# Patient Record
Sex: MALE | Race: OTHER | HISPANIC OR LATINO | URBAN - METROPOLITAN AREA
[De-identification: names, ages, dates, MRNs, and addresses within clinical notes are randomized per-mention and may not be internally consistent; named-entity substitution may affect disease eponyms.]

---

## 2022-07-08 ENCOUNTER — EMERGENCY (EMERGENCY)
Facility: HOSPITAL | Age: 32
LOS: 1 days | Discharge: HOME | End: 2022-07-08
Attending: EMERGENCY MEDICINE
Payer: MEDICAID

## 2022-07-08 VITALS
OXYGEN SATURATION: 96 % | RESPIRATION RATE: 17 BRPM | HEART RATE: 97 BPM | HEIGHT: 65 IN | SYSTOLIC BLOOD PRESSURE: 151 MMHG | WEIGHT: 220.02 LBS | TEMPERATURE: 99 F | DIASTOLIC BLOOD PRESSURE: 88 MMHG

## 2022-07-08 PROCEDURE — 99283 EMERGENCY DEPT VISIT LOW MDM: CPT | Mod: 25

## 2022-07-08 PROCEDURE — 94640 AIRWAY INHALATION TREATMENT: CPT

## 2022-07-08 PROCEDURE — 99284 EMERGENCY DEPT VISIT MOD MDM: CPT

## 2022-07-08 RX ORDER — ALBUTEROL 90 UG/1
2 AEROSOL, METERED ORAL
Qty: 1 | Refills: 0
Start: 2022-07-08

## 2022-07-08 RX ORDER — ALBUTEROL 90 UG/1
2.5 AEROSOL, METERED ORAL
Refills: 0 | Status: DISCONTINUED | OUTPATIENT
Start: 2022-07-08 | End: 2022-07-12

## 2022-07-08 RX ADMIN — ALBUTEROL 2.5 MILLIGRAM(S): 90 AEROSOL, METERED ORAL at 17:57

## 2022-07-08 RX ADMIN — Medication 60 MILLIGRAM(S): at 17:47

## 2022-07-08 NOTE — ED PROVIDER NOTE - OBJECTIVE STATEMENT
31 year old male with past medical history of asthma presents to the ED with complaints of cough and trouble breathing for 5 days. The patient states that he had been taking Azithromycin for 3 days, and that [facility] MD prescribed him with Amoxicillin yesterday. He further reports that he feels like his symptoms are still getting worse, and he has been taking his Albuterol inhaler more frequently. The patient denies ever being hospitalized for asthma or using steroids. NKDA.

## 2022-07-08 NOTE — ED ADULT TRIAGE NOTE - CHIEF COMPLAINT QUOTE
diagnosed with pneumonia and prescribed antibiotics yesterday, wants to make sure of the medication prescribed are good.

## 2022-07-08 NOTE — ED PROVIDER NOTE - RESPIRATORY, MLM
Breath sounds clear and equal bilaterally. Speaking in full sentences. No respiratory distress. No wheezing.

## 2022-07-08 NOTE — ED PROVIDER NOTE - PATIENT PORTAL LINK FT
You can access the FollowMyHealth Patient Portal offered by [facility] by registering at the following website: [email or web address]. By joining [employer]’s FollowMyHealth portal, you will also be able to view your health information using other applications (apps) compatible with our system.

## 2022-07-09 RX ORDER — ALBUTEROL 90 UG/1
2 AEROSOL, METERED ORAL
Qty: 1 | Refills: 0
Start: 2022-07-09

## 2022-07-11 PROBLEM — J45.909 UNSPECIFIED ASTHMA, UNCOMPLICATED: Chronic | Status: ACTIVE | Noted: 2022-07-08

## 2022-09-13 PROBLEM — Z00.00 ENCOUNTER FOR PREVENTIVE HEALTH EXAMINATION: Status: ACTIVE | Noted: 2022-09-13

## 2022-09-20 ENCOUNTER — APPOINTMENT (OUTPATIENT)
Dept: PULMONOLOGY | Facility: CLINIC | Age: 32
End: 2022-09-20

## 2023-02-01 ENCOUNTER — EMERGENCY (EMERGENCY)
Facility: HOSPITAL | Age: 33
LOS: 1 days | Discharge: HOME | End: 2023-02-01
Attending: STUDENT IN AN ORGANIZED HEALTH CARE EDUCATION/TRAINING PROGRAM
Payer: MEDICAID

## 2023-02-01 VITALS — DIASTOLIC BLOOD PRESSURE: 102 MMHG | SYSTOLIC BLOOD PRESSURE: 143 MMHG

## 2023-02-01 VITALS
DIASTOLIC BLOOD PRESSURE: 102 MMHG | WEIGHT: 235.89 LBS | HEIGHT: 65 IN | RESPIRATION RATE: 16 BRPM | OXYGEN SATURATION: 97 % | SYSTOLIC BLOOD PRESSURE: 159 MMHG | TEMPERATURE: 99 F | HEART RATE: 94 BPM

## 2023-02-01 PROCEDURE — 99283 EMERGENCY DEPT VISIT LOW MDM: CPT

## 2023-02-01 PROCEDURE — 99284 EMERGENCY DEPT VISIT MOD MDM: CPT

## 2023-02-01 RX ORDER — IBUPROFEN 200 MG
600 TABLET ORAL ONCE
Refills: 0 | Status: COMPLETED | OUTPATIENT
Start: 2023-02-01 | End: 2023-02-01

## 2023-02-01 RX ADMIN — Medication 600 MILLIGRAM(S): at 14:56

## 2023-02-01 NOTE — ED ADULT NURSE NOTE - NSIMPLEMENTINTERV_GEN_ALL_ED
Implemented All Universal Safety Interventions:  [place] to call system. Call bell, personal items and telephone within reach. Instruct patient to call for assistance. Room bathroom lighting operational. Non-slip footwear when patient is off stretcher. Physically safe environment: no spills, clutter or unnecessary equipment. Stretcher in lowest position, wheels locked, appropriate side rails in place.

## 2023-02-01 NOTE — ED PROVIDER NOTE - OBJECTIVE STATEMENT
32-year-old male, no significant past medical history, presents for evaluation of right middle toe pain x3 days.  Reports that the toe had an ingrown nail.  3 days ago tried to cut it on by himself.  Since then developed pain swelling and redness.  Denies any drainage, fever, chills or any other complaints.

## 2023-02-01 NOTE — ED PROVIDER NOTE - CLINICAL SUMMARY MEDICAL DECISION MAKING FREE TEXT BOX
32-year-old male, presents for evaluation of right foot middle toe pain and swelling x3 days.  Well-appearing, afebrile.  Exam consistent with ingrown toenail.  Findings related to inflammation.  No signs of paronychia or cellulitis.  Discussed home care and will refer to podiatry clinic for definitive treatment.  Patient was found to be hypertensive in the emergency room but denies any associated symptoms.  Reports feeling very nervous when he is in the emergency room.  Has a primary care doctor and can follow-up within 1 week.  Discuss about risks of short/long-term untreated hypertension.

## 2023-02-01 NOTE — ED PROVIDER NOTE - PATIENT PORTAL LINK FT
You can access the FollowMyHealth Patient Portal offered by [facility] by registering at the following website: [email or web address]. By joining [employer]’s FollowMyHealth portal, you will also be able to view your health information using other applications (apps) compatible with our system.

## 2023-02-01 NOTE — ED PROVIDER NOTE - NSFOLLOWUPINSTRUCTIONS_ED_ALL_ED_FT
Follow up with the podiatry clinic within 1 week.  For pain you can take over the counter Ibuprofen 600 mg orally every 6 hours as needed for pain. Take medication with food.   Warm soaks of the toe 3-4 times per day for 15 minutes each time.  If you experience any new or worsening symptoms or if you are concerned you can always come back to the emergency for a re-evaluation.

## 2023-02-01 NOTE — ED PROVIDER NOTE - NSFOLLOWUPCLINICS_GEN_ALL_ED_FT
[facility]  Podiatry/Wound Care  [address] [place] [address]  Phone: [phone]  Fax: [phone]

## 2023-02-01 NOTE — ED PROVIDER NOTE - PHYSICAL EXAMINATION
Right foot middle toe exam: Unilateral periungual mild edema and erythema. Small area of overlapping soft tissue on the nail. No fluctuance no drainage. small area of scab blood.  No streaking.  No bony tenderness.

## 2025-07-26 ENCOUNTER — EMERGENCY (EMERGENCY)
Facility: HOSPITAL | Age: 35
LOS: 1 days | End: 2025-07-26
Attending: STUDENT IN AN ORGANIZED HEALTH CARE EDUCATION/TRAINING PROGRAM
Payer: COMMERCIAL

## 2025-07-26 VITALS
RESPIRATION RATE: 17 BRPM | WEIGHT: 260.15 LBS | HEIGHT: 66 IN | HEART RATE: 82 BPM | DIASTOLIC BLOOD PRESSURE: 90 MMHG | OXYGEN SATURATION: 99 % | TEMPERATURE: 98 F | SYSTOLIC BLOOD PRESSURE: 140 MMHG

## 2025-07-26 VITALS
SYSTOLIC BLOOD PRESSURE: 132 MMHG | RESPIRATION RATE: 18 BRPM | HEART RATE: 79 BPM | DIASTOLIC BLOOD PRESSURE: 84 MMHG | TEMPERATURE: 98 F | OXYGEN SATURATION: 97 %

## 2025-07-26 LAB
ALBUMIN SERPL ELPH-MCNC: 4 G/DL — SIGNIFICANT CHANGE UP (ref 3.5–5)
ALP SERPL-CCNC: 52 U/L — SIGNIFICANT CHANGE UP (ref 40–120)
ALT FLD-CCNC: 58 U/L DA — SIGNIFICANT CHANGE UP (ref 10–60)
ANION GAP SERPL CALC-SCNC: 2 MMOL/L — LOW (ref 5–17)
APPEARANCE UR: ABNORMAL
AST SERPL-CCNC: 80 U/L — HIGH (ref 10–40)
BASOPHILS # BLD AUTO: 0.05 K/UL — SIGNIFICANT CHANGE UP (ref 0–0.2)
BASOPHILS NFR BLD AUTO: 0.5 % — SIGNIFICANT CHANGE UP (ref 0–2)
BILIRUB SERPL-MCNC: 0.4 MG/DL — SIGNIFICANT CHANGE UP (ref 0.2–1.2)
BILIRUB UR-MCNC: NEGATIVE — SIGNIFICANT CHANGE UP
BUN SERPL-MCNC: 15 MG/DL — SIGNIFICANT CHANGE UP (ref 7–18)
CALCIUM SERPL-MCNC: 8.9 MG/DL — SIGNIFICANT CHANGE UP (ref 8.4–10.5)
CHLORIDE SERPL-SCNC: 101 MMOL/L — SIGNIFICANT CHANGE UP (ref 96–108)
CO2 SERPL-SCNC: 24 MMOL/L — SIGNIFICANT CHANGE UP (ref 22–31)
COLOR SPEC: YELLOW — SIGNIFICANT CHANGE UP
CREAT SERPL-MCNC: 0.99 MG/DL — SIGNIFICANT CHANGE UP (ref 0.5–1.3)
DIFF PNL FLD: NEGATIVE — SIGNIFICANT CHANGE UP
EGFR: 102 ML/MIN/1.73M2 — SIGNIFICANT CHANGE UP
EGFR: 102 ML/MIN/1.73M2 — SIGNIFICANT CHANGE UP
EOSINOPHIL # BLD AUTO: 0.09 K/UL — SIGNIFICANT CHANGE UP (ref 0–0.5)
EOSINOPHIL NFR BLD AUTO: 0.9 % — SIGNIFICANT CHANGE UP (ref 0–6)
GLUCOSE SERPL-MCNC: 111 MG/DL — HIGH (ref 70–99)
GLUCOSE UR QL: NEGATIVE MG/DL — SIGNIFICANT CHANGE UP
HCT VFR BLD CALC: 44.5 % — SIGNIFICANT CHANGE UP (ref 39–50)
HGB BLD-MCNC: 14.8 G/DL — SIGNIFICANT CHANGE UP (ref 13–17)
IMM GRANULOCYTES NFR BLD AUTO: 0.4 % — SIGNIFICANT CHANGE UP (ref 0–0.9)
KETONES UR QL: NEGATIVE MG/DL — SIGNIFICANT CHANGE UP
LEUKOCYTE ESTERASE UR-ACNC: NEGATIVE — SIGNIFICANT CHANGE UP
LYMPHOCYTES # BLD AUTO: 1.37 K/UL — SIGNIFICANT CHANGE UP (ref 1–3.3)
LYMPHOCYTES # BLD AUTO: 14.2 % — SIGNIFICANT CHANGE UP (ref 13–44)
MCHC RBC-ENTMCNC: 28 PG — SIGNIFICANT CHANGE UP (ref 27–34)
MCHC RBC-ENTMCNC: 33.3 G/DL — SIGNIFICANT CHANGE UP (ref 32–36)
MCV RBC AUTO: 84.1 FL — SIGNIFICANT CHANGE UP (ref 80–100)
MONOCYTES # BLD AUTO: 0.33 K/UL — SIGNIFICANT CHANGE UP (ref 0–0.9)
MONOCYTES NFR BLD AUTO: 3.4 % — SIGNIFICANT CHANGE UP (ref 2–14)
NEUTROPHILS # BLD AUTO: 7.76 K/UL — HIGH (ref 1.8–7.4)
NEUTROPHILS NFR BLD AUTO: 80.6 % — HIGH (ref 43–77)
NITRITE UR-MCNC: NEGATIVE — SIGNIFICANT CHANGE UP
NRBC BLD AUTO-RTO: 0 /100 WBCS — SIGNIFICANT CHANGE UP (ref 0–0)
PH UR: 7 — SIGNIFICANT CHANGE UP (ref 5–8)
PLATELET # BLD AUTO: 295 K/UL — SIGNIFICANT CHANGE UP (ref 150–400)
POTASSIUM SERPL-MCNC: 5.9 MMOL/L — HIGH (ref 3.5–5.3)
POTASSIUM SERPL-SCNC: 5.9 MMOL/L — HIGH (ref 3.5–5.3)
PROT SERPL-MCNC: 8.8 G/DL — HIGH (ref 6–8.3)
PROT UR-MCNC: NEGATIVE MG/DL — SIGNIFICANT CHANGE UP
RBC # BLD: 5.29 M/UL — SIGNIFICANT CHANGE UP (ref 4.2–5.8)
RBC # FLD: 13 % — SIGNIFICANT CHANGE UP (ref 10.3–14.5)
RBC CASTS # UR COMP ASSIST: SIGNIFICANT CHANGE UP /HPF (ref 0–4)
SODIUM SERPL-SCNC: 127 MMOL/L — LOW (ref 135–145)
SP GR SPEC: 1.01 — SIGNIFICANT CHANGE UP (ref 1–1.03)
TROPONIN I, HIGH SENSITIVITY RESULT: 3.2 NG/L — SIGNIFICANT CHANGE UP
UROBILINOGEN FLD QL: 0.2 MG/DL — SIGNIFICANT CHANGE UP (ref 0.2–1)
WBC # BLD: 9.64 K/UL — SIGNIFICANT CHANGE UP (ref 3.8–10.5)
WBC # FLD AUTO: 9.64 K/UL — SIGNIFICANT CHANGE UP (ref 3.8–10.5)
WBC UR QL: SIGNIFICANT CHANGE UP /HPF (ref 0–5)

## 2025-07-26 PROCEDURE — 70450 CT HEAD/BRAIN W/O DYE: CPT

## 2025-07-26 PROCEDURE — 70450 CT HEAD/BRAIN W/O DYE: CPT | Mod: 26

## 2025-07-26 PROCEDURE — 84484 ASSAY OF TROPONIN QUANT: CPT

## 2025-07-26 PROCEDURE — 80053 COMPREHEN METABOLIC PANEL: CPT

## 2025-07-26 PROCEDURE — 85025 COMPLETE CBC W/AUTO DIFF WBC: CPT

## 2025-07-26 PROCEDURE — 96360 HYDRATION IV INFUSION INIT: CPT

## 2025-07-26 PROCEDURE — 96361 HYDRATE IV INFUSION ADD-ON: CPT

## 2025-07-26 PROCEDURE — 36415 COLL VENOUS BLD VENIPUNCTURE: CPT

## 2025-07-26 PROCEDURE — 93005 ELECTROCARDIOGRAM TRACING: CPT

## 2025-07-26 PROCEDURE — 99285 EMERGENCY DEPT VISIT HI MDM: CPT | Mod: 25

## 2025-07-26 PROCEDURE — 99285 EMERGENCY DEPT VISIT HI MDM: CPT

## 2025-07-26 PROCEDURE — 82962 GLUCOSE BLOOD TEST: CPT

## 2025-07-26 PROCEDURE — 81001 URINALYSIS AUTO W/SCOPE: CPT

## 2025-07-26 RX ORDER — SODIUM ZIRCONIUM CYCLOSILICATE 5 G/5G
5 POWDER, FOR SUSPENSION ORAL ONCE
Refills: 0 | Status: COMPLETED | OUTPATIENT
Start: 2025-07-26 | End: 2025-07-26

## 2025-07-26 RX ORDER — MECLIZINE HCL 12.5 MG
50 TABLET ORAL ONCE
Refills: 0 | Status: COMPLETED | OUTPATIENT
Start: 2025-07-26 | End: 2025-07-26

## 2025-07-26 RX ADMIN — Medication 1000 MILLILITER(S): at 16:30

## 2025-07-26 RX ADMIN — Medication 1000 MILLILITER(S): at 14:08

## 2025-07-26 RX ADMIN — Medication 1000 MILLILITER(S): at 15:22

## 2025-07-26 RX ADMIN — SODIUM ZIRCONIUM CYCLOSILICATE 5 GRAM(S): 5 POWDER, FOR SUSPENSION ORAL at 16:48

## 2025-07-26 RX ADMIN — Medication 50 MILLIGRAM(S): at 14:07

## 2025-07-26 RX ADMIN — Medication 1000 MILLILITER(S): at 15:10

## 2025-07-26 NOTE — ED PROVIDER NOTE - PHYSICAL EXAMINATION
Gen: no acute distress  Head: normocephalic, atraumatic  Lung: CTAB, no respiratory distress, no wheezing, rales, rhonchi  CV: normal s1/s2, rrr,   Abd: soft, non-tender, non-distended  MSK:  full range of motion in all 4 extremities  Neuro: CN 2-12 intact, no FND, PERRLA, EOMI, moving all extremities equally, sensation intact, no dysmetria, no ataxia, negative heel-shin, no diplopia, visual acuity intact.

## 2025-07-26 NOTE — ED PROVIDER NOTE - PROGRESS NOTE DETAILS
Imaging within normal limits.  Labs significant for hyponatremia.  Patient states he has been drinking a lot of red bull at work and has not been drinking much water.  IV fluids given.  Patient does not want to wait for repeat.  Potassium hemolyzed, Lokelma given.  Patient does not want to wait for second set of labs.  Well-appearing symptoms much improved.  Vital sign stable.  Ready for DC with outpatient follow-up. Dylan García MD.

## 2025-07-26 NOTE — ED PROVIDER NOTE - OBJECTIVE STATEMENT
Patient is a 34-year-old male with  no significant past medical or past surgical history presenting with dizziness and lightheadedness since yesterday.  As per patient and fiancé at bedside, patient has had dizziness with room spinning as well as lightheadedness since yesterday.  Denies any chest pain, shortness of breath, nausea vomiting, headache, blurry vision, dysphagia, dysarthria, paresthesias.  Vital signs stable, no focal neurologic deficits, following commands, moving all extremities equally.  Denies any past medical history, allergies, surgeries.

## 2025-07-26 NOTE — ED PROVIDER NOTE - CLINICAL SUMMARY MEDICAL DECISION MAKING FREE TEXT BOX
Patient is a 34-year-old male with  no significant past medical or past surgical history presenting with dizziness and lightheadedness since yesterday.  As per patient and fiancé at bedside, patient has had dizziness with room spinning as well as lightheadedness since yesterday. Vital signs stable, no focal neurologic deficits, following commands, moving all extremities equally.    -   Will hydrate, treat  likely peripheral vertigo, check labs rule out electrolyte abnormalities, EKG troponin to assess for ACS versus arrhythmia, CT head to rule out acute bleed versus mass, reassess.

## 2025-07-26 NOTE — ED PROVIDER NOTE - PATIENT PORTAL LINK FT
You can access the FollowMyHealth Patient Portal offered by [facility] by registering at the following website: [email or web address]. By joining [employer]’s FollowMyHealth portal, you will also be able to view your health information using other applications (apps) compatible with our system.

## 2025-07-26 NOTE — ED PROVIDER NOTE - NSFOLLOWUPINSTRUCTIONS_ED_ALL_ED_FT
Follow up with your Primary Care Doctor within 7 days. If you do not have one, you can call the Internal Medicine office number below and make an appointment.    [facility]  Internal Medicine  [address] [place] [address]  Phone: [phone]  Fax: [phone]      Hyponatremia  Hyponatremia is when the amount of salt (sodium) in a person's blood is too low. When sodium levels are low, the cells absorb extra water, which causes them to swell. The swelling happens throughout the body, but it mostly affects the brain.    What are the causes?  This condition may be caused by:  Certain medical conditions, such as:  Heart, kidney, or liver problems.  Thyroid problems.  Adrenal gland problems.  Metabolic conditions, such as Jose's disease or syndrome of inappropriate antidiuresis (SIAD).  Excessive vomiting, diarrhea, or sweating.  Certain medicines or illegal drugs.  Fluids given through an IV.  What increases the risk?  You are more likely to develop this condition if you:  Have certain medical conditions such as heart, kidney, or liver failure.  Have a medical condition that causes frequent or excessive diarrhea.  Participate in intense physical activities, such as marathon running.  Take certain medicines that affect the sodium and fluid balance in the blood. Some of these medicine types include:  Diuretics.  NSAIDs, such as ibuprofen.  Some opioid pain medicines.  Some antidepressants.  Some seizure prevention medicines.  What are the signs or symptoms?  Symptoms of this condition include:  Headache.  Nausea and vomiting.  Being very tired (lethargic).  Muscle weakness and cramping.  Loss of appetite.  Feeling weak or light-headed.  Severe symptoms of this condition include:  Confusion.  Agitation.  Having a rapid heart rate.  Fainting.  Seizures.  Coma.  How is this diagnosed?  This condition is diagnosed based on:  A physical exam.  Your medical history.  Tests, including:  Blood tests.  Urine tests.  How is this treated?  A person receiving fluids through an IV in the arm. The person is in a hospital bed.  Treatment for this condition depends on the cause. Treatment may include:  Getting fluids through an IV that is inserted into one of your veins.  Medicines to correct the sodium imbalance. If medicines are causing the condition, the medicines will need to be adjusted.  Limiting your water or fluid intake to get the correct sodium balance, in certain cases.  Monitoring in the hospital to closely watch your symptoms for improvement.  Follow these instructions at home:  A sign showing that a person should not drink alcohol.  Take over-the-counter and prescription medicines only as told by your health care provider. Many medicines can make this condition worse. Talk with your health care provider about any medicines that you are currently taking.  Do not drink alcohol.  Keep all follow-up visits. This is important.  Contact a health care provider if:  You develop worsening nausea, fatigue, headache, confusion, or weakness.  Your symptoms go away and then return.  Get help right away if:  You have a seizure.  You faint.  You have ongoing diarrhea or vomiting.  Summary  Hyponatremia is when the amount of salt (sodium) in your blood is too low.  When sodium levels are low, your cells absorb extra water, which causes them to swell.  The swelling happens throughout the body, but it mostly affects the brain.  Treatment for this condition depends on the cause. It may include receiving IV fluids, taking or adjusting medicines, limiting fluid intake, and monitoring in the hospital.  This information is not intended to replace advice given to you by your health care provider. Make sure you discuss any questions you have with your health care provider.

## 2025-07-26 NOTE — ED ADULT NURSE NOTE - CHPI ED NUR SYMPTOMS NEG
no blurred vision/no change in level of consciousness/no loss of consciousness/no numbness/no vomiting/no weakness